# Patient Record
Sex: FEMALE | Race: WHITE | Employment: STUDENT | URBAN - METROPOLITAN AREA
[De-identification: names, ages, dates, MRNs, and addresses within clinical notes are randomized per-mention and may not be internally consistent; named-entity substitution may affect disease eponyms.]

---
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
## 2024-10-18 ENCOUNTER — HOSPITAL ENCOUNTER (EMERGENCY)
Facility: HOSPITAL | Age: 19
Discharge: HOME | End: 2024-10-19
Attending: EMERGENCY MEDICINE
Payer: COMMERCIAL

## 2024-10-18 ENCOUNTER — APPOINTMENT (OUTPATIENT)
Dept: MRI IMAGING | Facility: HOSPITAL | Age: 19
End: 2024-10-18
Attending: EMERGENCY MEDICINE
Payer: COMMERCIAL

## 2024-10-18 DIAGNOSIS — R51.9 NONINTRACTABLE HEADACHE, UNSPECIFIED CHRONICITY PATTERN, UNSPECIFIED HEADACHE TYPE: Primary | ICD-10-CM

## 2024-10-18 DIAGNOSIS — R20.2 PARESTHESIAS: ICD-10-CM

## 2024-10-18 LAB
AMPHET UR QL SCN: NEGATIVE
ANION GAP SERPL CALC-SCNC: 5 MMOL/L (ref 0–18)
B-HCG UR QL: NEGATIVE
BARBITURATES UR QL SCN: NEGATIVE
BASOPHILS # BLD AUTO: 0.04 X10(3) UL (ref 0–0.2)
BASOPHILS NFR BLD AUTO: 0.4 %
BENZODIAZ UR QL SCN: NEGATIVE
BILIRUB UR QL: NEGATIVE
BUN BLD-MCNC: 8 MG/DL (ref 9–23)
BUN/CREAT SERPL: 11 (ref 10–20)
CALCIUM BLD-MCNC: 9.6 MG/DL (ref 8.7–10.4)
CANNABINOIDS UR QL SCN: NEGATIVE
CHLORIDE SERPL-SCNC: 108 MMOL/L (ref 98–112)
CO2 SERPL-SCNC: 26 MMOL/L (ref 21–32)
COCAINE UR QL: NEGATIVE
COLOR UR: YELLOW
CREAT BLD-MCNC: 0.73 MG/DL
CREAT UR-SCNC: 173.6 MG/DL
DEPRECATED RDW RBC AUTO: 39 FL (ref 35.1–46.3)
EGFRCR SERPLBLD CKD-EPI 2021: 121 ML/MIN/1.73M2 (ref 60–?)
EOSINOPHIL # BLD AUTO: 0.05 X10(3) UL (ref 0–0.7)
EOSINOPHIL NFR BLD AUTO: 0.5 %
ERYTHROCYTE [DISTWIDTH] IN BLOOD BY AUTOMATED COUNT: 12.4 % (ref 11–15)
FENTANYL UR QL SCN: NEGATIVE
GLUCOSE BLD-MCNC: 91 MG/DL (ref 70–99)
GLUCOSE UR-MCNC: NORMAL MG/DL
HCT VFR BLD AUTO: 37.8 %
HGB BLD-MCNC: 13.7 G/DL
IMM GRANULOCYTES # BLD AUTO: 0.02 X10(3) UL (ref 0–1)
IMM GRANULOCYTES NFR BLD: 0.2 %
KETONES UR-MCNC: NEGATIVE MG/DL
LEUKOCYTE ESTERASE UR QL STRIP.AUTO: NEGATIVE
LYMPHOCYTES # BLD AUTO: 3.5 X10(3) UL (ref 1–4)
LYMPHOCYTES NFR BLD AUTO: 37.9 %
MAGNESIUM SERPL-MCNC: 2.1 MG/DL (ref 1.6–2.6)
MCH RBC QN AUTO: 31.2 PG (ref 26–34)
MCHC RBC AUTO-ENTMCNC: 36.2 G/DL (ref 31–37)
MCV RBC AUTO: 86.1 FL
MDMA UR QL SCN: NEGATIVE
METHADONE UR QL SCN: NEGATIVE
MONOCYTES # BLD AUTO: 0.52 X10(3) UL (ref 0.1–1)
MONOCYTES NFR BLD AUTO: 5.6 %
NEUTROPHILS # BLD AUTO: 5.11 X10 (3) UL (ref 1.5–7.7)
NEUTROPHILS # BLD AUTO: 5.11 X10(3) UL (ref 1.5–7.7)
NEUTROPHILS NFR BLD AUTO: 55.4 %
NITRITE UR QL STRIP.AUTO: NEGATIVE
OPIATES UR QL SCN: NEGATIVE
OSMOLALITY SERPL CALC.SUM OF ELEC: 286 MOSM/KG (ref 275–295)
OXYCODONE UR QL SCN: NEGATIVE
PCP UR QL SCN: NEGATIVE
PH UR: 7 [PH] (ref 5–8)
PLATELET # BLD AUTO: 271 10(3)UL (ref 150–450)
POTASSIUM SERPL-SCNC: 4 MMOL/L (ref 3.5–5.1)
RBC # BLD AUTO: 4.39 X10(6)UL
SODIUM SERPL-SCNC: 139 MMOL/L (ref 136–145)
SP GR UR STRIP: 1.02 (ref 1–1.03)
UROBILINOGEN UR STRIP-ACNC: NORMAL
VIT B12 SERPL-MCNC: 613 PG/ML (ref 211–911)
WBC # BLD AUTO: 9.2 X10(3) UL (ref 4–11)

## 2024-10-18 PROCEDURE — 80307 DRUG TEST PRSMV CHEM ANLYZR: CPT | Performed by: EMERGENCY MEDICINE

## 2024-10-18 PROCEDURE — 96375 TX/PRO/DX INJ NEW DRUG ADDON: CPT

## 2024-10-18 PROCEDURE — 81001 URINALYSIS AUTO W/SCOPE: CPT | Performed by: EMERGENCY MEDICINE

## 2024-10-18 PROCEDURE — 72148 MRI LUMBAR SPINE W/O DYE: CPT | Performed by: EMERGENCY MEDICINE

## 2024-10-18 PROCEDURE — 96374 THER/PROPH/DIAG INJ IV PUSH: CPT

## 2024-10-18 PROCEDURE — 83735 ASSAY OF MAGNESIUM: CPT | Performed by: EMERGENCY MEDICINE

## 2024-10-18 PROCEDURE — 85025 COMPLETE CBC W/AUTO DIFF WBC: CPT | Performed by: EMERGENCY MEDICINE

## 2024-10-18 PROCEDURE — 81025 URINE PREGNANCY TEST: CPT

## 2024-10-18 PROCEDURE — 80048 BASIC METABOLIC PNL TOTAL CA: CPT | Performed by: EMERGENCY MEDICINE

## 2024-10-18 PROCEDURE — 99284 EMERGENCY DEPT VISIT MOD MDM: CPT

## 2024-10-18 PROCEDURE — 70551 MRI BRAIN STEM W/O DYE: CPT | Performed by: EMERGENCY MEDICINE

## 2024-10-18 PROCEDURE — 99285 EMERGENCY DEPT VISIT HI MDM: CPT

## 2024-10-18 PROCEDURE — 82607 VITAMIN B-12: CPT | Performed by: EMERGENCY MEDICINE

## 2024-10-18 RX ORDER — METOCLOPRAMIDE HYDROCHLORIDE 5 MG/ML
10 INJECTION INTRAMUSCULAR; INTRAVENOUS ONCE
Status: COMPLETED | OUTPATIENT
Start: 2024-10-18 | End: 2024-10-18

## 2024-10-18 RX ORDER — DIPHENHYDRAMINE HYDROCHLORIDE 50 MG/ML
25 INJECTION INTRAMUSCULAR; INTRAVENOUS ONCE
Status: COMPLETED | OUTPATIENT
Start: 2024-10-18 | End: 2024-10-18

## 2024-10-18 RX ORDER — DROSPIRENONE AND ETHINYL ESTRADIOL 0.02-3(28)
1 KIT ORAL DAILY
COMMUNITY
Start: 2024-10-18

## 2024-10-19 ENCOUNTER — APPOINTMENT (OUTPATIENT)
Dept: CT IMAGING | Facility: HOSPITAL | Age: 19
End: 2024-10-19
Attending: EMERGENCY MEDICINE
Payer: COMMERCIAL

## 2024-10-19 VITALS
HEIGHT: 64 IN | RESPIRATION RATE: 18 BRPM | DIASTOLIC BLOOD PRESSURE: 66 MMHG | BODY MASS INDEX: 18.27 KG/M2 | HEART RATE: 64 BPM | SYSTOLIC BLOOD PRESSURE: 101 MMHG | TEMPERATURE: 97 F | OXYGEN SATURATION: 99 % | WEIGHT: 107 LBS

## 2024-10-19 PROCEDURE — 70498 CT ANGIOGRAPHY NECK: CPT | Performed by: EMERGENCY MEDICINE

## 2024-10-19 PROCEDURE — 70496 CT ANGIOGRAPHY HEAD: CPT | Performed by: EMERGENCY MEDICINE

## 2024-10-19 RX ORDER — KETOROLAC TROMETHAMINE 15 MG/ML
15 INJECTION, SOLUTION INTRAMUSCULAR; INTRAVENOUS ONCE
Status: COMPLETED | OUTPATIENT
Start: 2024-10-19 | End: 2024-10-19

## 2024-10-19 RX ORDER — METOCLOPRAMIDE 10 MG/1
10 TABLET ORAL EVERY 6 HOURS PRN
Qty: 20 TABLET | Refills: 0 | Status: SHIPPED | OUTPATIENT
Start: 2024-10-19 | End: 2024-10-24

## 2024-10-19 RX ORDER — KETOROLAC TROMETHAMINE 10 MG/1
10 TABLET, FILM COATED ORAL EVERY 6 HOURS PRN
Qty: 20 TABLET | Refills: 0 | Status: SHIPPED | OUTPATIENT
Start: 2024-10-19 | End: 2024-10-24

## 2024-10-19 NOTE — ED PROVIDER NOTES
Patient Seen in: [facility] Emergency Department    History     Chief Complaint   Patient presents with    Numbness     Stated Complaint: numbness, headache     HPI    19-year-old female with past medical history of scoliosis presenting with mother and boyfriend for evaluation with perioral and intraoral paresthesias associated with 1 day of right-sided headache in addition to confusion and sensation of inability to feel urination.  Headache associated with nausea/vomiting, no abdominal pain or overt urinary complaints aside from inability to feel/sense bladder emptying.  No chest or abdominal pain, no overt back pain.    Past Medical History:    Adolescent idiopathic scoliosis of thoracolumbar region    seeing ortho peds.    HOSPITALIZATIONS    BW 5lb 12 oz    Leg length discrepancy    left one cm. seeing ortho peds.       Past Surgical History:   Procedure Laterality Date    Remove tonsils/adenoids,<10 y/o  1/7/09    Performed by VERN RAZA at [facility], [facility]    Tonsillectomy  2009    T&A            Family History   Problem Relation Age of Onset    Heart Disorder Father         angina    Hypertension Father     Hypertension Mother     Diabetes Maternal Grandmother     Heart Disorder Maternal Grandmother         MI    Cancer Paternal Grandmother         colon       Social History     Socioeconomic History    Marital status: Single   Tobacco Use    Smoking status: Never    Smokeless tobacco: Never   Vaping Use    Vaping status: Never Used   Substance and Sexual Activity    Alcohol use: No     Alcohol/week: 0.0 standard drinks of alcohol    Drug use: Yes     Types: Cannabis       Review of Systems :  Constitutional: As per HPI  HENT: Negative for ear pain and rhinorrhea.  (+) facial/tongue tingling.  Eyes: Negative for discharge and visual disturbance.   Neurological: (+) headaches/yesterday.    Positive for stated complaint: numbness, headache  Other systems are as noted in  HPI.  Constitutional and vital signs reviewed.      All other systems reviewed and negative except as noted above.    PSFH elements reviewed from today and agreed except as otherwise stated in HPI.    Physical Exam     ED Triage Vitals [10/18/24 2126]   /78   Pulse 78   Resp 18   Temp 97.3 °F (36.3 °C)   Temp src Oral   SpO2 100 %   O2 Device None (Room air)       Current:/78   Pulse 78   Temp 97.3 °F (36.3 °C) (Oral)   Resp 18   Ht 162.6 cm (5' 4\")   Wt 48.5 kg   LMP 10/03/2024 (Exact Date)   SpO2 100%   BMI 18.37 kg/m²         Physical Exam   Constitutional: No distress.   HEENT: MMM.  Head: Normocephalic.   Eyes: No injection. No photophobia. Full/painless EOM with horizontal nystagmus.  Neck: Neck supple. No meningismus.  Cardiovascular: RRR.   Pulmonary/Chest: Effort normal. CTAB.  Abdominal: Soft. Nontender.  Musculoskeletal: No gross deformity. No midline thoracolumbar tenderness/stepoff/deformity.  Neurological: Alert. CN II-XII grossly intact. BUE/BLE proximally and distally with 5/5 strenth. BL FTN intact.  Skin: Skin is warm.   Psychiatric: Cooperative.  Nursing note and vitals reviewed.        ED Course     Labs Reviewed   BASIC METABOLIC PANEL (8) - Abnormal; Notable for the following components:       Result Value    BUN 8 (*)     All other components within normal limits   URINALYSIS, ROUTINE - Abnormal; Notable for the following components:    Clarity Urine Ex.Turbid (*)     Blood Urine 2+ (*)     Protein Urine Trace (*)     RBC Urine 6-10 (*)     Squamous Epi. Cells Few (*)     Transitional Cells Few (*)     All other components within normal limits   MAGNESIUM - Normal   VITAMIN B12 - Normal   POCT PREGNANCY URINE - Normal   CBC WITH DIFFERENTIAL WITH PLATELET   DRUG ABUSE PANEL 11 SCREEN   Preliminary Radiology Report  [facility], [facility]  [phone] - Phone    [facility]    NAME: JUSTIN SCHOFIELD    DATE OF EXAM: 10/18/2024  Patient No:  [id number]  Physician:   JAYDEN^2  YOB: 2005    Past Medical History (entered by Technologist):    Reason For Exam (entered by Technologist):  numbness/headaches  Other Notes (entered by Technologist): right sided numbness radiating down entire body; heaches  [phone]    Additional Information (per [facility]):          MRI brain without contrast  MRI lumbar spine without contrast    COMPARISON: CT and CTA brain of the same date.    IMPRESSION:  Brain:  No acute infarct, intracranial hemorrhage, mass effect, midline shift, or hydrocephalus.    Lumbar spine:  No acute abnormality in the lumbar spine.  Normal vertebral heights, alignment and marrow signal.  No significant spinal canal or foraminal stenosis at any level.    Report finalized on 10/19/24 at 1:21 AM ET.      Dr. Krupa Bertrand MD  This report has been electronically signed and verified by the Radiologist whose name is printed above.       This report contains privileged and confidential information and is intended solely for the use of the individual or entity to which it is addressed. If you are not the intended recipient of this report, you are hereby notified that any copying, distribution, dissemination or action taken in relation to the contents of this report is strictly prohibited and may be unlawful. If you have received this report in error, please notify the sender immediately at [phone] and permanently delete the original report and destroy any copies or printouts.  Preliminary Radiology Report  [facility], [facility]  [phone] - Phone    [facility]    NAME: JUSTIN SCHOFIELD    DATE OF EXAM: 10/18/2024  Patient No:  [id number]  Physician:  JAYDEN^Ray  YOB: 2005    Past Medical History (entered by Technologist):    Reason For Exam (entered by Technologist):  Headache, numbness all over body  Other Notes (entered by Technologist): ROOM 42/ 16041    Additional Information (per [facility]):          CT HEAD WITHOUT CONTRAST  CTA COW WITH CONTRAST  CTA NECK WITH CONTRAST    COMPARISON: MRI brain of the same date    IMPRESSION:    CT HEAD:  No acute intracranial hemorrhage, mass effect, midline shift, or hydrocephalus.   No loss of gray-white matter differentiation.  No acute calvarial fracture.      CTA COW:  No significant stenosis or occlusion of the major proximal intracranial arteries.    CTA NECK:  No significant stenosis or occlusion of the major cervical arteries.          Report finalized on 10/19/24 at 1:22 AM ET.      Dr. Krupa Bertrand MD  This report has been electronically signed and verified by the Radiologist whose name is printed above.       This report contains privileged and confidential information and is intended solely for the use of the individual or entity to which it is addressed. If you are not the intended recipient of this report, you are hereby notified that any copying, distribution, dissemination or action taken in relation to the contents of this report is strictly prohibited and may be unlawful. If you have received this report in error, please notify the sender immediately at [phone] and permanently delete the original report and destroy any copies or printouts.    ED Course as of 10/19/24 0556  ------------------------------------------------------------  Time: 10/19 0053  Comment: Resting comfortably       MDM   DIFFERENTIAL DIAGNOSIS: After history and physical exam differential diagnosis includes but is not limited to CVA, demyelinating disease, electrolyte B12 derangement.    Pulse ox: 100%:Normal on RA, as independently interpreted by myself    Medical Decision Making  Evaluation for varying c/o paresthesias associated with headache/dizziness in neurologically intact patient without meningismus though with seeming change in ability to sense bladder emptying. Labs/imaging nonacute, symptoms improving with meds - stable for discharge with ongoing  outpatient followup including neurology referral for followup.    Problems Addressed:  Nonintractable headache, unspecified chronicity pattern, unspecified headache type: acute illness or injury  Paresthesias: undiagnosed new problem with uncertain prognosis    Amount and/or Complexity of Data Reviewed  Labs: ordered. Decision-making details documented in ED Course.  Radiology: ordered and independent interpretation performed. Decision-making details documented in ED Course.     Details: CTH without obvious ICH as independently interpreted by myself    Risk  Prescription drug management.        I was wearing at minimum a facemask and eye protection throughout this encounter with handwashing performed prior and after patient evaluation without personal hand/facial/oropharyngeal contact and gloves worn throughout encounter. See note and/or contact this provider for further PPE details.      Disposition and Plan     Clinical Impression:  1. Nonintractable headache, unspecified chronicity pattern, unspecified headache type    2. Paresthesias      Disposition:  Discharge    Follow-up:  Cari Marshall MD  [address] [address]  [phone]    Call  For followup and re-evaluation.      Medications Prescribed:  Discharge Medication List as of 10/19/2024  1:04 AM        START taking these medications    Details   metoclopramide 10 MG Oral Tab Take 1 tablet (10 mg total) by mouth every 6 (six) hours as needed (For headache/nausea/vomiting.)., Normal, Disp-20 tablet, R-0      Ketorolac Tromethamine 10 MG Oral Tab Take 1 tablet (10 mg total) by mouth every 6 (six) hours as needed for Pain., Normal, Disp-20 tablet, R-0

## 2024-10-19 NOTE — ED INITIAL ASSESSMENT (HPI)
Pt ambulatory through triage c/o entire body numbness for last week. Pt states she has been confused and had a headache as well.

## 2024-10-19 NOTE — ED QUICK NOTES
Patient safe to be discharged home per provider. Discharge education given via printed AVS. Reviewed: follow up care, medications and when to seek emergency care. Patient verbalizes understanding and is able to teach back. Patient ambulated to exit with mother.